# Patient Record
Sex: FEMALE | Race: BLACK OR AFRICAN AMERICAN | URBAN - METROPOLITAN AREA
[De-identification: names, ages, dates, MRNs, and addresses within clinical notes are randomized per-mention and may not be internally consistent; named-entity substitution may affect disease eponyms.]

---

## 2022-05-06 ENCOUNTER — OFFICE VISIT (OUTPATIENT)
Dept: FAMILY MEDICINE CLINIC | Facility: CLINIC | Age: 77
End: 2022-05-06
Payer: MEDICARE

## 2022-05-06 VITALS
HEIGHT: 62.8 IN | HEART RATE: 82 BPM | WEIGHT: 170 LBS | SYSTOLIC BLOOD PRESSURE: 124 MMHG | BODY MASS INDEX: 30.12 KG/M2 | DIASTOLIC BLOOD PRESSURE: 85 MMHG | TEMPERATURE: 98 F | RESPIRATION RATE: 18 BRPM

## 2022-05-06 DIAGNOSIS — Z13.820 ENCOUNTER FOR OSTEOPOROSIS SCREENING IN ASYMPTOMATIC POSTMENOPAUSAL PATIENT: ICD-10-CM

## 2022-05-06 DIAGNOSIS — I10 PRIMARY HYPERTENSION: ICD-10-CM

## 2022-05-06 DIAGNOSIS — Z76.89 ESTABLISHING CARE WITH NEW DOCTOR, ENCOUNTER FOR: ICD-10-CM

## 2022-05-06 DIAGNOSIS — Z78.0 ENCOUNTER FOR OSTEOPOROSIS SCREENING IN ASYMPTOMATIC POSTMENOPAUSAL PATIENT: ICD-10-CM

## 2022-05-06 DIAGNOSIS — Z12.11 COLON CANCER SCREENING: Primary | ICD-10-CM

## 2022-05-06 DIAGNOSIS — Z12.31 ENCOUNTER FOR SCREENING MAMMOGRAM FOR MALIGNANT NEOPLASM OF BREAST: ICD-10-CM

## 2022-05-06 PROCEDURE — 99204 OFFICE O/P NEW MOD 45 MIN: CPT | Performed by: FAMILY MEDICINE

## 2022-05-06 PROCEDURE — 3008F BODY MASS INDEX DOCD: CPT | Performed by: FAMILY MEDICINE

## 2022-05-06 PROCEDURE — 3074F SYST BP LT 130 MM HG: CPT | Performed by: FAMILY MEDICINE

## 2022-05-06 PROCEDURE — 3079F DIAST BP 80-89 MM HG: CPT | Performed by: FAMILY MEDICINE

## 2022-05-06 RX ORDER — HYDROCHLOROTHIAZIDE 12.5 MG/1
12.5 CAPSULE, GELATIN COATED ORAL DAILY
Qty: 90 CAPSULE | Refills: 3 | Status: SHIPPED | OUTPATIENT
Start: 2022-05-06

## 2022-05-06 RX ORDER — AMLODIPINE BESYLATE 10 MG/1
10 TABLET ORAL DAILY
Qty: 90 TABLET | Refills: 3 | Status: SHIPPED | OUTPATIENT
Start: 2022-05-06

## 2022-05-06 RX ORDER — HYDROCHLOROTHIAZIDE 12.5 MG/1
12.5 CAPSULE, GELATIN COATED ORAL DAILY
COMMUNITY
End: 2022-05-06

## 2022-05-06 RX ORDER — BRIMONIDINE TARTRATE 0.15 %
DROPS OPHTHALMIC (EYE)
COMMUNITY
Start: 2022-01-09

## 2022-05-06 RX ORDER — AMLODIPINE BESYLATE 10 MG/1
10 TABLET ORAL DAILY
COMMUNITY
End: 2022-05-06

## 2022-05-06 RX ORDER — BENAZEPRIL HYDROCHLORIDE 40 MG/1
40 TABLET, FILM COATED ORAL DAILY
Qty: 90 TABLET | Refills: 3 | Status: SHIPPED | OUTPATIENT
Start: 2022-05-06

## 2022-05-06 RX ORDER — OFLOXACIN 3 MG/ML
SOLUTION/ DROPS OPHTHALMIC
COMMUNITY
Start: 2021-05-15

## 2022-05-06 RX ORDER — BENAZEPRIL HYDROCHLORIDE 40 MG/1
40 TABLET, FILM COATED ORAL DAILY
COMMUNITY
End: 2022-05-06

## 2022-05-06 NOTE — PATIENT INSTRUCTIONS
All adult screening ordered and done appropriate for patient's age and gender and risk factors and complaints. Medication reviewed and renewed where needed and appropriate. Comply with medications. Monitor blood pressures and record at home. Limit salt intake. GI consultation generated. Osteoporosis screen recommended.

## 2022-09-09 ENCOUNTER — OFFICE VISIT (OUTPATIENT)
Dept: FAMILY MEDICINE CLINIC | Facility: CLINIC | Age: 77
End: 2022-09-09
Payer: MEDICARE

## 2022-09-09 VITALS
HEART RATE: 60 BPM | BODY MASS INDEX: 29.66 KG/M2 | SYSTOLIC BLOOD PRESSURE: 130 MMHG | TEMPERATURE: 97 F | OXYGEN SATURATION: 95 % | WEIGHT: 167.38 LBS | HEIGHT: 63 IN | DIASTOLIC BLOOD PRESSURE: 88 MMHG | RESPIRATION RATE: 12 BRPM

## 2022-09-09 DIAGNOSIS — Z12.31 ENCOUNTER FOR SCREENING MAMMOGRAM FOR BREAST CANCER: Primary | ICD-10-CM

## 2022-09-09 DIAGNOSIS — I10 PRIMARY HYPERTENSION: ICD-10-CM

## 2022-09-09 PROCEDURE — 3008F BODY MASS INDEX DOCD: CPT | Performed by: FAMILY MEDICINE

## 2022-09-09 PROCEDURE — 3075F SYST BP GE 130 - 139MM HG: CPT | Performed by: FAMILY MEDICINE

## 2022-09-09 PROCEDURE — 3079F DIAST BP 80-89 MM HG: CPT | Performed by: FAMILY MEDICINE

## 2022-09-09 PROCEDURE — 99214 OFFICE O/P EST MOD 30 MIN: CPT | Performed by: FAMILY MEDICINE

## 2022-09-09 RX ORDER — AMLODIPINE BESYLATE 10 MG/1
10 TABLET ORAL DAILY
Qty: 90 TABLET | Refills: 3 | Status: SHIPPED | OUTPATIENT
Start: 2022-09-09

## 2022-09-09 RX ORDER — BENAZEPRIL HYDROCHLORIDE 40 MG/1
40 TABLET, FILM COATED ORAL DAILY
Qty: 90 TABLET | Refills: 3 | Status: SHIPPED | OUTPATIENT
Start: 2022-09-09

## 2022-09-09 RX ORDER — HYDROCHLOROTHIAZIDE 12.5 MG/1
12.5 CAPSULE, GELATIN COATED ORAL DAILY
Qty: 90 CAPSULE | Refills: 3 | Status: SHIPPED | OUTPATIENT
Start: 2022-09-09

## 2022-09-09 NOTE — PATIENT INSTRUCTIONS
Medication has been reviewed and renewed. Order for mammogram has been placed on the chart. Medication reviewed and renewed where needed and appropriate. Comply with medications. Monitor blood pressures and record at home. Limit salt intake.

## 2023-05-17 ENCOUNTER — HOSPITAL ENCOUNTER (OUTPATIENT)
Age: 78
Discharge: HOME | End: 2023-05-17
Payer: MEDICARE

## 2023-05-17 VITALS
DIASTOLIC BLOOD PRESSURE: 89 MMHG | TEMPERATURE: 98 F | HEART RATE: 72 BPM | RESPIRATION RATE: 18 BRPM | OXYGEN SATURATION: 99 % | SYSTOLIC BLOOD PRESSURE: 129 MMHG

## 2023-05-17 DIAGNOSIS — L24.9 IRRITANT CONTACT DERMATITIS, UNSPECIFIED TRIGGER: Primary | ICD-10-CM

## 2023-05-17 DIAGNOSIS — R03.0 ELEVATED BLOOD PRESSURE READING: ICD-10-CM

## 2023-05-17 PROCEDURE — 99203 OFFICE O/P NEW LOW 30 MIN: CPT | Performed by: NURSE PRACTITIONER

## 2023-05-17 RX ORDER — TRIAMCINOLONE ACETONIDE 5 MG/G
1 OINTMENT TOPICAL 2 TIMES DAILY
Qty: 30 G | Refills: 1 | Status: SHIPPED | OUTPATIENT
Start: 2023-05-17

## 2023-05-17 NOTE — ED INITIAL ASSESSMENT (HPI)
Pt here with a red itchy rash to bilateral arms, chest and behind bilateral ears for a week after gardening and using a chemical. Pt has used benadryl with little relief.

## 2023-09-06 ENCOUNTER — TELEPHONE (OUTPATIENT)
Dept: FAMILY MEDICINE CLINIC | Facility: CLINIC | Age: 78
End: 2023-09-06

## 2023-09-06 DIAGNOSIS — I10 PRIMARY HYPERTENSION: ICD-10-CM

## 2023-09-06 RX ORDER — HYDROCHLOROTHIAZIDE 12.5 MG/1
12.5 CAPSULE, GELATIN COATED ORAL DAILY
Qty: 90 CAPSULE | Refills: 3 | Status: SHIPPED | OUTPATIENT
Start: 2023-09-06

## 2023-09-06 RX ORDER — AMLODIPINE BESYLATE 10 MG/1
10 TABLET ORAL DAILY
Qty: 90 TABLET | Refills: 3 | Status: SHIPPED | OUTPATIENT
Start: 2023-09-06

## 2023-09-06 RX ORDER — BENAZEPRIL HYDROCHLORIDE 40 MG/1
40 TABLET, FILM COATED ORAL DAILY
Qty: 90 TABLET | Refills: 3 | Status: SHIPPED | OUTPATIENT
Start: 2023-09-06

## 2023-09-06 NOTE — TELEPHONE ENCOUNTER
Please review; protocol failed. No active /future labs noted   Message sent for patient to make an appointment. Requested Prescriptions   Pending Prescriptions Disp Refills    HYDROCHLOROTHIAZIDE 12.5 MG Oral Cap [Pharmacy Med Name: HYDROCHLOROTHIAZIDE 12.5 MG CP] 90 capsule 3     Sig: TAKE 1 CAPSULE BY MOUTH EVERY DAY       Hypertensive Medications Protocol Failed - 9/6/2023 12:05 AM        Failed - CMP or BMP in past 6 months     No results found for this or any previous visit (from the past 4392 hour(s)). Failed - In person appointment or virtual visit in the past 6 months     Recent Outpatient Visits              12 months ago Encounter for screening mammogram for breast cancer    [facility],     Office Visit    1 year ago Colon cancer screening    [facility] [place]    Office Visit                      Failed - EGFRCR or GFRAA > 50     GFR Evaluation            Passed - In person appointment in the past 12 or next 3 months     Recent Outpatient Visits              12 months ago Encounter for screening mammogram for breast cancer    [facility],     Office Visit    1 year ago Colon cancer screening    [facility] [place]    Office Visit                      Passed - Last BP reading less than 140/90     BP Readings from Last 1 Encounters:  05/17/23 : 129/89                BENAZEPRIL HCL 40 MG Oral Tab [Pharmacy Med Name: BENAZEPRIL HCL 40 MG TABLET] 90 tablet 3     Sig: TAKE 1 TABLET BY MOUTH EVERY DAY       Hypertensive Medications Protocol Failed - 9/6/2023 12:05 AM        Failed - CMP or BMP in past 6 months     No results found for this or any previous visit (from the past 4392 hour(s)).             Failed - In person appointment or virtual visit in the past 6 months     Recent Outpatient Visits              12 months ago Encounter for screening mammogram for breast cancer    [address] [place] [address],     Office Visit    1 year ago Colon cancer screening    [address] [place] [address]    Office Visit                      Failed - EGFRCR or GFRAA > 50     GFR Evaluation            Passed - In person appointment in the past 12 or next 3 months     Recent Outpatient Visits              12 months ago Encounter for screening mammogram for breast cancer    [address] [place] [address],     Office Visit    1 year ago Colon cancer screening    [address] [place] [address]    Office Visit                      Passed - Last BP reading less than 140/90     BP Readings from Last 1 Encounters:  05/17/23 : 129/89                AMLODIPINE 10 MG Oral Tab [Pharmacy Med Name: AMLODIPINE BESYLATE 10 MG TAB] 90 tablet 3     Sig: TAKE 1 TABLET BY MOUTH EVERY DAY       Hypertensive Medications Protocol Failed - 9/6/2023 12:05 AM        Failed - CMP or BMP in past 6 months     No results found for this or any previous visit (from the past 4392 hour(s)).             Failed - In person appointment or virtual visit in the past 6 months     Recent Outpatient Visits              12 months ago Encounter for screening mammogram for breast cancer    [address] [place] [address],     Office Visit    1 year ago Colon cancer screening    [address] [place] [address]    Office Visit                      Failed - EGFRCR or GFRAA > 50     GFR Evaluation            Passed - In person appointment in the past 12 or next 3 months     Recent Outpatient Visits              12 months ago Encounter for screening mammogram for breast cancer    [facility], [address] [facility], [place], [place]    Office Visit    1 year ago Colon cancer screening    [address] [facility], [facility] [place], [place]    Office Visit                      Passed - Last BP reading less than 140/90     BP Readings from Last 1 Encounters:  05/17/23 : 129/89                 Recent Outpatient Visits              12 months ago Encounter for screening mammogram for breast cancer    [address] [facility], [place], [place]    Office Visit    1 year ago Colon cancer screening    [address] [facility], [place]    Office Visit

## 2024-01-04 ENCOUNTER — OFFICE VISIT (OUTPATIENT)
Dept: FAMILY MEDICINE CLINIC | Facility: CLINIC | Age: 79
End: 2024-01-04
Payer: MEDICARE

## 2024-01-04 ENCOUNTER — LAB ENCOUNTER (OUTPATIENT)
Dept: LAB | Age: 79
End: 2024-01-04
Attending: FAMILY MEDICINE
Payer: MEDICARE

## 2024-01-04 VITALS
WEIGHT: 166 LBS | DIASTOLIC BLOOD PRESSURE: 89 MMHG | SYSTOLIC BLOOD PRESSURE: 124 MMHG | BODY MASS INDEX: 29.41 KG/M2 | HEART RATE: 69 BPM | HEIGHT: 63 IN | TEMPERATURE: 98 F

## 2024-01-04 DIAGNOSIS — Z28.21 VARICELLA ZOSTER VIRUS (VZV) VACCINATION DECLINED: ICD-10-CM

## 2024-01-04 DIAGNOSIS — I10 PRIMARY HYPERTENSION: ICD-10-CM

## 2024-01-04 DIAGNOSIS — Z00.00 MEDICARE ANNUAL WELLNESS VISIT, SUBSEQUENT: Primary | ICD-10-CM

## 2024-01-04 DIAGNOSIS — Z13.820 ENCOUNTER FOR OSTEOPOROSIS SCREENING IN ASYMPTOMATIC POSTMENOPAUSAL PATIENT: ICD-10-CM

## 2024-01-04 DIAGNOSIS — Z12.31 ENCOUNTER FOR SCREENING MAMMOGRAM FOR BREAST CANCER: ICD-10-CM

## 2024-01-04 DIAGNOSIS — Z28.21 PNEUMOCOCCAL VACCINATION DECLINED BY PATIENT: ICD-10-CM

## 2024-01-04 DIAGNOSIS — Z78.0 ENCOUNTER FOR OSTEOPOROSIS SCREENING IN ASYMPTOMATIC POSTMENOPAUSAL PATIENT: ICD-10-CM

## 2024-01-04 PROCEDURE — 1126F AMNT PAIN NOTED NONE PRSNT: CPT | Performed by: FAMILY MEDICINE

## 2024-01-04 PROCEDURE — 3074F SYST BP LT 130 MM HG: CPT | Performed by: FAMILY MEDICINE

## 2024-01-04 PROCEDURE — 96160 PT-FOCUSED HLTH RISK ASSMT: CPT | Performed by: FAMILY MEDICINE

## 2024-01-04 PROCEDURE — 1159F MED LIST DOCD IN RCRD: CPT | Performed by: FAMILY MEDICINE

## 2024-01-04 PROCEDURE — 3008F BODY MASS INDEX DOCD: CPT | Performed by: FAMILY MEDICINE

## 2024-01-04 PROCEDURE — G0439 PPPS, SUBSEQ VISIT: HCPCS | Performed by: FAMILY MEDICINE

## 2024-01-04 PROCEDURE — 1170F FXNL STATUS ASSESSED: CPT | Performed by: FAMILY MEDICINE

## 2024-01-04 PROCEDURE — 3079F DIAST BP 80-89 MM HG: CPT | Performed by: FAMILY MEDICINE

## 2024-01-04 RX ORDER — LATANOPROST 50 UG/ML
1 SOLUTION/ DROPS OPHTHALMIC NIGHTLY
COMMUNITY
Start: 2023-08-05

## 2024-01-04 RX ORDER — DORZOLAMIDE HYDROCHLORIDE AND TIMOLOL MALEATE 20; 5 MG/ML; MG/ML
1 SOLUTION/ DROPS OPHTHALMIC 2 TIMES DAILY
COMMUNITY

## 2024-01-04 RX ORDER — AMLODIPINE BESYLATE 10 MG/1
10 TABLET ORAL DAILY
Qty: 90 TABLET | Refills: 3 | Status: SHIPPED | OUTPATIENT
Start: 2024-01-04

## 2024-01-04 RX ORDER — SODIUM CHLORIDE 5 %
1 OINTMENT (GRAM) OPHTHALMIC (EYE) AS NEEDED
COMMUNITY

## 2024-01-04 NOTE — PATIENT INSTRUCTIONS
All adult screening ordered and done appropriate for patient's age and gender and risk factors and complaints.  Medication reviewed and renewed where needed and appropriate.  Recommend weight loss via daily exercising and consistent healthy dietary changes.  Encouraged physical fitness and daily physical activity daily.  Comply with medications.

## 2024-01-04 NOTE — PROGRESS NOTES
Subjective:     Patient ID: Ryanne Javed is a 78 year old female.    This patient has a 78-year-old hypertensive -American female who is here for Medicare annual visit which will also satisfy all the requirements for a complete preventive care physical and for status update on any confirmed chronic medical illnesses and follow up on any previous labs or procedures that were suggestive or in need of further work up. Colonoscopy is current. Bowel and bladder functions are intact.    Patient is due for mammogram and also due for osteoporosis screening.    Patient declines recommended shingles and pneumococcal vaccine.    Regarding hypertension, the patient is asymptomatic.  Patient denies headache, chest pain, dizziness, shortness of breath, visual changes, and/or exertional fatigue.          History/Other:   Review of Systems  Current Outpatient Medications   Medication Sig Dispense Refill    dorzolamide-timolol 2-0.5 % Ophthalmic Solution 1 drop 2 (two) times daily.      polypropylene glycol- 0.4-0.3 % Ophthalmic Solution Place 1 drop into the right eye as needed.      sodium chloride, hypertonic, 5 % Ophthalmic Ointment 1 Application as needed.      amLODIPine 10 MG Oral Tab Take 1 tablet (10 mg total) by mouth daily. 90 tablet 3    hydroCHLOROthiazide 12.5 MG Oral Cap Take 1 capsule (12.5 mg total) by mouth daily. 90 capsule 3    triamcinolone 0.5 % External Ointment Apply 1 Application topically 2 (two) times daily. 30 g 1    latanoprost 0.005 % Ophthalmic Solution Place 1 drop into the left eye nightly. AT BEDTIME (Patient not taking: Reported on 1/4/2024)      Benazepril HCl 40 MG Oral Tab Take 1 tablet (40 mg total) by mouth daily. (Patient not taking: Reported on 1/4/2024) 90 tablet 3    brimonidine 0.15 % Ophthalmic Solution APPLY 1 DROP INTO AFFECTED EYE AS DIRECTED (Patient not taking: Reported on 1/4/2024)      ofloxacin 0.3 % Ophthalmic Solution  (Patient not taking: Reported on 1/4/2024)        Allergies:No Known Allergies    Past Medical History:   Diagnosis Date    Cataract     Essential hypertension       History reviewed. No pertinent surgical history.   History reviewed. No pertinent family history.   Social History:   Social History     Socioeconomic History    Marital status:    Tobacco Use    Smoking status: Never    Smokeless tobacco: Never   Vaping Use    Vaping Use: Never used   Substance and Sexual Activity    Alcohol use: Never    Drug use: Never        Ryanne Javed's SCREENING SCHEDULE   Tests on this list are recommended by your physician but may not be covered, or covered at this frequency, by your insurer. Please check with your insurance carrier before scheduling to verify coverage.   PREVENTATIVE SERVICES  INDICATIONS AND SCHEDULE Internal Lab or Procedure External Lab or Procedure   Diabetes Screening      HbgA1C   Annually No results found for: \"A1C\"      No data to display                Fasting Blood Sugar (FSB)Annually No results found for: \"GLUCOSE\", \"GLU\"      No data to display               Cardiovascular Disease Screening     LDL Annually No results found for: \"LDL\", \"LDLC\"     EKG One Time      Colorectal Cancer Screening      Colonoscopy Screen every 10 years No recommendations at this time Update Health Maintenance if applicable    Flex Sigmoidoscopy Screen every 5 years No results found for this or any previous visit.      No data to display                 Fecal Occult Blood Annually No results found for: \"FOB\", \"OCCULTSTOOL\"      No data to display                Glaucoma Screening      Ophthalmology Visit Annually      Bone Density Screening      Dexascan Every two years No results found for this or any previous visit.        No data to display               Pap and Pelvic      Pap: Every 3 yrs age 21-65 or Pap+HPV every 5 yrs age 30-65, age 65 and older at high risk   No recommendations at this time Update Health Maintenance if applicable    Chlamydia   Annually if high risk No results found for: \"CHLAMYDIA\"      No data to display                Screening Mammogram      Mammogram  Annually No recommendations at this time Update Health Maintenance if applicable   Immunizations      Influenza No orders found for this or any previous visit. Update Immunization Activity if applicable    Pneumococcal No orders found for this or any previous visit. Update Immunization Activity if applicable    Hepatitis B No orders found for this or any previous visit. Update Immunization Activity if applicable    Tetanus No orders found for this or any previous visit. Update Immunization Activity if applicable    Zoster (Not covered by Medicare Part B) No orders found for this or any previous visit. Update Immunization Activity if applicable     SPECIFIC DISEASE MONITORING Internal Lab or Procedure External Lab or Procedure   Annual Monitoring of Persistent     Medications (ACE/ARB, digoxin, diuretics)    Potassium  Annually No results found for: \"K\", \"POTASSIUM\"      No data to display                Creatinine  Annually No results found for: \"CREATININE\", \"CREATSERUM\"      No data to display                Digoxin Serum Conc  Annually No results found for: \"DIGOXIN\"      No data to display                Diabetes      HgbA1C  Annually No results found for: \"A1C\"      No data to display                Creat/alb ratio  Annually      LDL  Annually No results found for: \"LDL\", \"LDLC\"      No data to display                 Dilated Eye exam  Annually      No data to display                   No data to display                COPD      Spirometry Testing Annually No results found for this or any previous visit.      No data to display                      General Health     In the past six months, have you lost more than 10 pounds without trying?: 2 - No    Has your appetite been poor?: No    Type of Diet: Balanced    How does the patient maintain a good energy level?: Daily  Walks;Stretching    How would you describe your daily physical activity?: Light    How would you describe your current health state?: Good    How do you maintain positive mental well-being?: Puzzles;Games;Visiting Friends         Have you had any immunizations at another office such as Influenza, Hepatitis B, Tetanus, or Pneumococcal?: No     Functional Ability     Bathing or Showering: Able without help    Toileting: Able without help    Dressing: Able without help    Eating: Able without help    Driving: Does not drive    Preparing your meals: Able without help    Managing money/bills: Able without help    Taking medications as prescribed: Able without help    Are you able to afford your medications?: Yes    Hearing Problems?: No     Functional Status     Hearing Problems?: No    Vision Problems? : Yes    Difficulty walking?: No    Difficulty dressing or bathing?: No    Problems with daily activities? : No    Memory Problems?: No      Fall/Risk Assessment                                                              Depression Screening (PHQ-2/PHQ-9): Over the LAST 2 WEEKS                      Advance Directives     Do you have a healthcare power of ?: Yes    Do you have a living will?: Yes     Hearing Assessment (Required for AWV/SWV)      Hearing Screening    Time taken: 1/4/2024  9:14 AM  Entry User: Selena Strong MA  Screening Method: Questionnaire  I have a problem hearing over the telephone: No I have trouble following the conversations when two or more people are talking at the same time: No   I have trouble understanding things on the TV: No I have to strain to understand conversations: No   I have to worry about missing the telephone ring or doorbell: No I have trouble hearing conversations in a noisy background such as a crowded room or restaurant: No   I get confused about where sounds come from: No I misunderstand some words in a sentence and need to ask people to repeat themselves: No   I  especially have trouble understanding the speech of women and children: No I have trouble understanding the speaker in a large room such as at a meeting or place of Islam: No   Many people I talk to seem to mumble (or don't speak clearly): No People get annoyed because I misunderstand what they say: No   I misunderstand what others are saying and make inappropriate responses: No I avoid social activities because I cannot hear well and fear I will reply improperly: No   Family members and friends have told me they think I may have hearing loss: No             Visual Acuity     Right Eye Visual Acuity: Uncorrected Left Eye Visual Acuity: Uncorrected   Right Eye Chart Acuity: 20/40 Left Eye Chart Acuity: 20/40     Cognitive Assessment     What day of the week is this?: Correct    What month is it?: Correct    What year is it?: Correct    Recall \"Ball\": Correct    Recall \"Flag\": Correct    Recall \"Tree\": Correct          Objective:   Vitals:    01/04/24 0906   BP: 124/89   Pulse: 69   Temp: 97.8 °F (36.6 °C)       Physical Exam  Constitutional:       Appearance: Normal appearance.   HENT:      Head: Normocephalic and atraumatic.      Right Ear: Tympanic membrane normal.      Left Ear: Tympanic membrane normal.      Nose: Nose normal.      Mouth/Throat:      Mouth: Mucous membranes are moist.   Neck:      Thyroid: No thyromegaly.   Cardiovascular:      Rate and Rhythm: Normal rate and regular rhythm.   Pulmonary:      Breath sounds: Normal breath sounds.   Abdominal:      General: Bowel sounds are normal.      Palpations: Abdomen is soft. There is no mass.      Comments: No pulsatile mass.   Neurological:      Mental Status: She is alert and oriented to person, place, and time.   Psychiatric:         Mood and Affect: Mood normal.         Assessment & Plan:   1. Medicare annual wellness visit, subsequent  Survey completed and the following labs have been ordered.  - CBC With Diff  - CMP  - Lipid Panel  - TSH,  Reflex  Free T4    2. Primary hypertension  Blood pressure measures to goal when measured manually and also electronically.  - amLODIPine 10 MG Oral Tab; Take 1 tablet (10 mg total) by mouth daily.  Dispense: 90 tablet; Refill: 3    3. Pneumococcal vaccination declined by patient  Pneumococcal vaccine declined by patient for now.  - Prevnar 20 (PCV20) [83401]    4. Varicella zoster virus (VZV) vaccination declined  Declined for now.    5. Encounter for osteoporosis screening in asymptomatic postmenopausal patient  Ordered.  - XR DEXA BONE DENSITOMETRY (CPT=77080); Future    6. Encounter for screening mammogram for breast cancer  Mammogram ordered.        Orders Placed This Encounter   Procedures    CBC With Diff    CMP    Lipid Panel    TSH,  Reflex Free T4    Prevnar 20 (PCV20) [66607]       Meds This Visit:  Requested Prescriptions     Signed Prescriptions Disp Refills    amLODIPine 10 MG Oral Tab 90 tablet 3     Sig: Take 1 tablet (10 mg total) by mouth daily.       Imaging & Referrals:  PCV20 VACCINE FOR INTRAMUSCULAR USE  XR DEXA BONE DENSITOMETRY (CPT=77080)     Patient Instructions   All adult screening ordered and done appropriate for patient's age and gender and risk factors and complaints.  Medication reviewed and renewed where needed and appropriate.  Recommend weight loss via daily exercising and consistent healthy dietary changes.  Encouraged physical fitness and daily physical activity daily.  Comply with medications.    Return in about 1 year (around 1/4/2025), or if symptoms worsen or fail to improve.

## 2024-01-05 LAB
ABSOLUTE BASOPHILS: 23 CELLS/UL (ref 0–200)
ABSOLUTE EOSINOPHILS: 120 CELLS/UL (ref 15–500)
ABSOLUTE LYMPHOCYTES: 2024 CELLS/UL (ref 850–3900)
ABSOLUTE MONOCYTES: 559 CELLS/UL (ref 200–950)
ABSOLUTE NEUTROPHILS: 2975 CELLS/UL (ref 1500–7800)
ALBUMIN/GLOBULIN RATIO: 1.4 (CALC) (ref 1–2.5)
ALBUMIN: 4.2 G/DL (ref 3.6–5.1)
ALKALINE PHOSPHATASE: 64 U/L (ref 37–153)
ALT: 10 U/L (ref 6–29)
AST: 16 U/L (ref 10–35)
BASOPHILS: 0.4 %
BILIRUBIN, TOTAL: 0.6 MG/DL (ref 0.2–1.2)
BUN: 15 MG/DL (ref 7–25)
CALCIUM: 9.8 MG/DL (ref 8.6–10.4)
CARBON DIOXIDE: 31 MMOL/L (ref 20–32)
CHLORIDE: 100 MMOL/L (ref 98–110)
CHOL/HDLC RATIO: 4.3 (CALC)
CHOLESTEROL, TOTAL: 291 MG/DL
CREATININE: 0.89 MG/DL (ref 0.6–1)
EGFR: 67 ML/MIN/1.73M2
EOSINOPHILS: 2.1 %
GLOBULIN: 3 G/DL (CALC) (ref 1.9–3.7)
GLUCOSE: 89 MG/DL (ref 65–99)
HDL CHOLESTEROL: 67 MG/DL
HEMATOCRIT: 42.7 % (ref 35–45)
HEMOGLOBIN: 13.9 G/DL (ref 11.7–15.5)
LDL-CHOLESTEROL: 198 MG/DL (CALC)
LYMPHOCYTES: 35.5 %
MCH: 28.4 PG (ref 27–33)
MCHC: 32.6 G/DL (ref 32–36)
MCV: 87.1 FL (ref 80–100)
MONOCYTES: 9.8 %
MPV: 9.6 FL (ref 7.5–12.5)
NEUTROPHILS: 52.2 %
NON-HDL CHOLESTEROL: 224 MG/DL (CALC)
PLATELET COUNT: 290 THOUSAND/UL (ref 140–400)
POTASSIUM: 4.4 MMOL/L (ref 3.5–5.3)
PROTEIN, TOTAL: 7.2 G/DL (ref 6.1–8.1)
RDW: 13.8 % (ref 11–15)
RED BLOOD CELL COUNT: 4.9 MILLION/UL (ref 3.8–5.1)
SODIUM: 139 MMOL/L (ref 135–146)
TRIGLYCERIDES: 115 MG/DL
TSH W/REFLEX TO FT4: 2.12 MIU/L (ref 0.4–4.5)
WHITE BLOOD CELL COUNT: 5.7 THOUSAND/UL (ref 3.8–10.8)

## 2024-01-17 DIAGNOSIS — I10 PRIMARY HYPERTENSION: ICD-10-CM

## 2024-01-17 NOTE — TELEPHONE ENCOUNTER
Patient was seen in clinic on 1/4 and says only script for rx amlodipine was sent. Patient says she never received scripts for rx hydrochlorodthiazide, and rx Benazepril. Please send to [facility]. Please update patient at [phone],thanks.

## 2024-01-18 DIAGNOSIS — I10 PRIMARY HYPERTENSION: ICD-10-CM

## 2024-01-18 RX ORDER — BENAZEPRIL HYDROCHLORIDE 40 MG/1
40 TABLET ORAL DAILY
Qty: 90 TABLET | Refills: 2 | Status: SHIPPED | OUTPATIENT
Start: 2024-01-18

## 2024-01-18 RX ORDER — HYDROCHLOROTHIAZIDE 12.5 MG/1
12.5 CAPSULE, GELATIN COATED ORAL DAILY
Qty: 90 CAPSULE | Refills: 2 | Status: SHIPPED | OUTPATIENT
Start: 2024-01-18

## 2024-01-18 NOTE — TELEPHONE ENCOUNTER
Refill passed per [facility] protocol.  Requested Prescriptions   Pending Prescriptions Disp Refills    hydroCHLOROthiazide 12.5 MG Oral Cap 90 capsule 3     Sig: Take 1 capsule (12.5 mg total) by mouth daily.       Hypertensive Medications Protocol Passed - 1/18/2024 10:00 AM        Passed - In person appointment in the past 12 or next 3 months     Recent Outpatient Visits              2 weeks ago Medicare annual wellness visit, subsequent    [facility], [facility] Van Max, DO    Office Visit    1 year ago Encounter for screening mammogram for breast cancer    [facility] Van Max, DO    Office Visit    1 year ago Colon cancer screening    [facility] Van Max, DO    Office Visit          Future Appointments         Provider Department Appt Notes    In 11 months Van Max, DO [facility] annual Medicare wellness exam               Passed - Last BP reading less than 140/90     BP Readings from Last 1 Encounters:   01/04/24 124/89               Passed - CMP or BMP in past 6 months     Recent Results (from the past 4392 hour(s))   CMP    Collection Time: 01/04/24 10:09 AM   Result Value Ref Range    GLUCOSE 89 65 - 99 mg/dL     Comment:               Fasting reference interval         UREA NITROGEN (BUN) 15 7 - 25 mg/dL    CREATININE 0.89 0.60 - 1.00 mg/dL    EGFR 67 > OR = 60 mL/min/1.73m2    BUN/CREATININE RATIO SEE NOTE: 6 - 22 (calc)     Comment:    Not Reported: BUN and Creatinine are within     reference range.            SODIUM 139 135 - 146 mmol/L    POTASSIUM 4.4 3.5 - 5.3 mmol/L    CHLORIDE 100 98 - 110 mmol/L    CARBON DIOXIDE 31 20 - 32 mmol/L    CALCIUM 9.8 8.6 - 10.4 mg/dL    PROTEIN, TOTAL 7.2 6.1 - 8.1 g/dL    ALBUMIN 4.2 3.6 - 5.1 g/dL    GLOBULIN 3.0 1.9 - 3.7 g/dL (calc)    ALBUMIN/GLOBULIN RATIO 1.4  1.0 - 2.5 (calc)    BILIRUBIN, TOTAL 0.6 0.2 - 1.2 mg/dL    ALKALINE PHOSPHATASE 64 37 - 153 U/L    AST 16 10 - 35 U/L    ALT 10 6 - 29 U/L     *Note: Due to a large number of results and/or encounters for the requested time period, some results have not been displayed. A complete set of results can be found in Results Review.               Passed - In person appointment or virtual visit in the past 6 months     Recent Outpatient Visits              2 weeks ago Medicare annual wellness visit, subsequent    [facility], [facility] Vna Max, DO    Office Visit    1 year ago Encounter for screening mammogram for breast cancer    [facility] Van Max, DO    Office Visit    1 year ago Colon cancer screening    [facility] Van aMx, DO    Office Visit          Future Appointments         Provider Department Appt Notes    In 11 months Van Max,  [facility], [facility] annual Medicare wellness exam               Passed - EGFRCR or GFRAA > 50     GFR Evaluation  EGFRCR: 67 , resulted on 1/4/2024            Benazepril HCl 40 MG Oral Tab 90 tablet 3     Sig: Take 1 tablet (40 mg total) by mouth daily.       Hypertensive Medications Protocol Passed - 1/18/2024 10:00 AM        Passed - In person appointment in the past 12 or next 3 months     Recent Outpatient Visits              2 weeks ago Medicare annual wellness visit, subsequent    [facility], [facility] Van Max, DO    Office Visit    1 year ago Encounter for screening mammogram for breast cancer    [facility] [place]    Office Visit    1 year ago Colon cancer screening    [facility] Van Max, DO    Office Visit          Future Appointments          Provider Department Appt Notes    In 11 months Van Max, DO [facility], [facility] annual Medicare wellness exam               Passed - Last BP reading less than 140/90     BP Readings from Last 1 Encounters:   01/04/24 124/89               Passed - CMP or BMP in past 6 months     Recent Results (from the past 4392 hour(s))   CMP    Collection Time: 01/04/24 10:09 AM   Result Value Ref Range    GLUCOSE 89 65 - 99 mg/dL     Comment:               Fasting reference interval         UREA NITROGEN (BUN) 15 7 - 25 mg/dL    CREATININE 0.89 0.60 - 1.00 mg/dL    EGFR 67 > OR = 60 mL/min/1.73m2    BUN/CREATININE RATIO SEE NOTE: 6 - 22 (calc)     Comment:    Not Reported: BUN and Creatinine are within     reference range.            SODIUM 139 135 - 146 mmol/L    POTASSIUM 4.4 3.5 - 5.3 mmol/L    CHLORIDE 100 98 - 110 mmol/L    CARBON DIOXIDE 31 20 - 32 mmol/L    CALCIUM 9.8 8.6 - 10.4 mg/dL    PROTEIN, TOTAL 7.2 6.1 - 8.1 g/dL    ALBUMIN 4.2 3.6 - 5.1 g/dL    GLOBULIN 3.0 1.9 - 3.7 g/dL (calc)    ALBUMIN/GLOBULIN RATIO 1.4 1.0 - 2.5 (calc)    BILIRUBIN, TOTAL 0.6 0.2 - 1.2 mg/dL    ALKALINE PHOSPHATASE 64 37 - 153 U/L    AST 16 10 - 35 U/L    ALT 10 6 - 29 U/L     *Note: Due to a large number of results and/or encounters for the requested time period, some results have not been displayed. A complete set of results can be found in Results Review.               Passed - In person appointment or virtual visit in the past 6 months     Recent Outpatient Visits              2 weeks ago Medicare annual wellness visit, subsequent    [facility] Van Max, DO    Office Visit    1 year ago Encounter for screening mammogram for breast cancer    [facility] Van Max, DO    Office Visit    1 year ago Colon cancer screening    [facility] [place],  Van GILES, DO    Office Visit          Future Appointments         Provider Department Appt Notes    In 11 months Van Max, DO [facility] annual Medicare wellness exam               Passed - EGFRCR or GFRAA > 50     GFR Evaluation  EGFRCR: 67 , resulted on 1/4/2024             Future Appointments         Provider Department Appt Notes    In 11 months Van Max, DO [facility] annual Medicare wellness exam          Recent Outpatient Visits              2 weeks ago Medicare annual wellness visit, subsequent    [facility] Van Max, DO    Office Visit    1 year ago Encounter for screening mammogram for breast cancer    [facility] Van Max, DO    Office Visit    1 year ago Colon cancer screening    [facility] Van Max, DO    Office Visit

## 2024-01-19 RX ORDER — BENAZEPRIL HYDROCHLORIDE 40 MG/1
40 TABLET ORAL DAILY
Qty: 90 TABLET | Refills: 2 | OUTPATIENT
Start: 2024-01-19

## 2024-01-19 RX ORDER — SODIUM CHLORIDE 5 %
1 OINTMENT (GRAM) OPHTHALMIC (EYE) AS NEEDED
Qty: 3.5 G | Refills: 0 | Status: SHIPPED | OUTPATIENT
Start: 2024-01-19

## 2024-01-19 NOTE — TELEPHONE ENCOUNTER
Please review; protocol failed/ has no protocol    Medication is listed as patient reported.    Requested Prescriptions   Pending Prescriptions Disp Refills    sodium chloride, hypertonic, 5 % Ophthalmic Ointment  0     Si Application as needed.       There is no refill protocol information for this order        Recent Outpatient Visits              2 weeks ago Medicare annual wellness visit, subsequent    [facility], [facility] Van Max,     Office Visit    1 year ago Encounter for screening mammogram for breast cancer    [facility] Van Max,     Office Visit    1 year ago Colon cancer screening    [facility] Van Max, DO    Office Visit          Future Appointments         Provider Department Appt Notes    In 11 months Van Max, DO [facility] annual Medicare wellness exam

## 2024-01-24 RX ORDER — SODIUM CHLORIDE 5 %
1 OINTMENT (GRAM) OPHTHALMIC (EYE) AS NEEDED
Qty: 3.5 G | Refills: 0 | Status: SHIPPED | OUTPATIENT
Start: 2024-01-24

## 2024-01-24 NOTE — TELEPHONE ENCOUNTER
sodium chloride, hypertonic, 5 % Ophthalmic Ointment, Place 1 Application into both eyes as needed., Disp: 3.5 g, Rfl: 0    Message: RX for aoduim chloride 5% OPHT OINT Missing dosing Frequency

## 2024-01-29 ENCOUNTER — TELEPHONE (OUTPATIENT)
Dept: FAMILY MEDICINE CLINIC | Facility: CLINIC | Age: 79
End: 2024-01-29

## 2024-01-29 NOTE — TELEPHONE ENCOUNTER
Dr. Max - Please specify Max dose per day, for Eye ointment.     RN Called pharmacy. Patient's date of birth and full name both confirmed.   Spoke with Danita   Needs specifications on Dosing frequency - max per day , as needed.

## 2024-01-31 NOTE — TELEPHONE ENCOUNTER
Can you please call the patient and ask her to have her ophthalmologist managed this prescription, so that he can be properly prescribed/refilled?  Thank you.

## 2024-01-31 NOTE — TELEPHONE ENCOUNTER
Verified name and .    Patient was advised of Dr. Max's message. Patient verbalizes understanding and agrees with plan.

## 2024-04-04 ENCOUNTER — TELEPHONE (OUTPATIENT)
Dept: FAMILY MEDICINE CLINIC | Facility: CLINIC | Age: 79
End: 2024-04-04

## 2024-04-04 DIAGNOSIS — Z01.00 EYE EXAM, ROUTINE: Primary | ICD-10-CM

## 2024-04-04 DIAGNOSIS — Z98.49 HISTORY OF CATARACT EXTRACTION, UNSPECIFIED LATERALITY: ICD-10-CM

## 2024-04-04 NOTE — TELEPHONE ENCOUNTER
Bryan stopped in the [place] [facility], states his mother, Ryanne, is at [facility] right now for a follow-up appointment. She was put on eye drops and is at the office to have a follow-up appointment to see if everything is OK with her eyes. She was told a referral is needed for today's appointment, she has [employer] Medicare HMO. Can a referral please be issued for today's appointment?

## 2024-04-05 ENCOUNTER — TELEPHONE (OUTPATIENT)
Dept: CASE MANAGEMENT | Age: 79
End: 2024-04-05

## 2024-04-05 DIAGNOSIS — Z86.69 HISTORY OF CATARACT: Primary | ICD-10-CM

## 2024-04-05 NOTE — TELEPHONE ENCOUNTER
Dr Max,     Please provide the name of the specialist at [facility] to referral [id number].    Specialist name is required to obtain authorization.     Thank you

## 2024-04-08 ENCOUNTER — TELEPHONE (OUTPATIENT)
Dept: FAMILY MEDICINE CLINIC | Facility: CLINIC | Age: 79
End: 2024-04-08

## 2024-08-28 DIAGNOSIS — I10 PRIMARY HYPERTENSION: ICD-10-CM

## 2024-08-30 RX ORDER — BENAZEPRIL HYDROCHLORIDE 40 MG/1
40 TABLET ORAL DAILY
Qty: 90 TABLET | Refills: 3 | Status: SHIPPED | OUTPATIENT
Start: 2024-08-30

## 2024-08-30 RX ORDER — HYDROCHLOROTHIAZIDE 12.5 MG/1
12.5 CAPSULE ORAL DAILY
Qty: 90 CAPSULE | Refills: 3 | Status: SHIPPED | OUTPATIENT
Start: 2024-08-30

## 2024-08-30 NOTE — TELEPHONE ENCOUNTER
Refill passed per [facility] protocols.    Requested Prescriptions   Pending Prescriptions Disp Refills    BENAZEPRIL HCL 40 MG Oral Tab [Pharmacy Med Name: BENAZEPRIL HCL 40 MG Tablet] 90 tablet 3     Sig: TAKE 1 TABLET EVERY DAY       Hypertension Medications Protocol Passed - 8/28/2024  7:37 PM        Passed - CMP or BMP in past 12 months        Passed - Last BP reading less than 140/90     BP Readings from Last 1 Encounters:   01/04/24 124/89               Passed - In person appointment or virtual visit in the past 12 mos or appointment in next 3 mos     Recent Outpatient Visits              7 months ago Medicare annual wellness visit, subsequent    [facility], [facility] Van Max, DO    Office Visit    1 year ago Encounter for screening mammogram for breast cancer    [facility] Van Max, DO    Office Visit    2 years ago Colon cancer screening    [facility], [facility] Van Max, DO    Office Visit          Future Appointments         Provider Department Appt Notes    In 4 months Van Max, DO [facility], [facility] annual Medicare wellness exam                    Passed - EGFRCR or GFRAA > 50     GFR Evaluation  EGFRCR: 67 , resulted on 1/4/2024            HYDROCHLOROTHIAZIDE 12.5 MG Oral Cap [Pharmacy Med Name: HYDROCHLOROTHIAZIDE 12.5 MG Capsule] 90 capsule 3     Sig: TAKE 1 CAPSULE EVERY DAY       Hypertension Medications Protocol Passed - 8/28/2024  7:37 PM        Passed - CMP or BMP in past 12 months        Passed - Last BP reading less than 140/90     BP Readings from Last 1 Encounters:   01/04/24 124/89               Passed - In person appointment or virtual visit in the past 12 mos or appointment in next 3 mos     Recent Outpatient Visits              7 months ago Medicare annual wellness visit, subsequent    [facility]  [facility], [facility] Van Max, DO    Office Visit    1 year ago Encounter for screening mammogram for breast cancer    [facility] Van Max, DO    Office Visit    2 years ago Colon cancer screening    [facility] Van Max, DO    Office Visit          Future Appointments         Provider Department Appt Notes    In 4 months Van Max,  [facility] annual Medicare wellness exam                    Passed - EGFRCR or GFRAA > 50     GFR Evaluation  EGFRCR: 67 , resulted on 1/4/2024

## 2024-11-11 ENCOUNTER — TELEPHONE (OUTPATIENT)
Dept: FAMILY MEDICINE CLINIC | Facility: CLINIC | Age: 79
End: 2024-11-11

## 2024-11-16 NOTE — TELEPHONE ENCOUNTER
I received a call from Mary BIRD. She is the [employer] .  Patient had a in home wellness visit as part of the human plan. The examer was concern with orthostatic hypotension her sitting  b/p was 118/95, standing dropped to 96/84. Patient is asymptomatic does not feel dizzy and no falls. Patient is on 3 different blood pressure medications. Mary BIRD wants  to be aware of this orthostatic hypotension. She did inform patient to call if having symptoms and that she was going to make her PCP aware.      do you want to see patient in the office?or make any medication changes?  
If I am reading the message correctly, the patient remained asymptomatic.  Position change blood pressure measures have to be done a certain way.  I am not suggesting that they were not done the proper way, but this is a patient that has not been seen since January 4, 2024 and she probably should have had a follow-up regarding her blood pressure before now.  She is getting close to her annual visit.  Manual measures or electronic measures by qualified staff should continue.  If the patient is dizzy, has headaches, has visual changes, etc.  Then she should call 911 or at least contact our clinic.  Thank you.  
Spoke to pt , relayed Dr message, verbalized understanding, stated she tried to explain how she get nervous, but feels ok and if need will call us or worse 911  
No

## 2024-11-18 DIAGNOSIS — I10 PRIMARY HYPERTENSION: ICD-10-CM

## 2024-11-22 RX ORDER — AMLODIPINE BESYLATE 10 MG/1
10 TABLET ORAL DAILY
Qty: 90 TABLET | Refills: 3 | Status: SHIPPED | OUTPATIENT
Start: 2024-11-22

## 2024-11-22 NOTE — TELEPHONE ENCOUNTER
Refill passed per [facility] protocols.    Requested Prescriptions   Pending Prescriptions Disp Refills    AMLODIPINE 10 MG Oral Tab [Pharmacy Med Name: amLODIPine Besylate Oral Tablet 10 MG] 90 tablet 3     Sig: TAKE 1 TABLET EVERY DAY       Hypertension Medications Protocol Passed - 11/22/2024  9:32 AM        Passed - CMP or BMP in past 12 months        Passed - Last BP reading less than 140/90     BP Readings from Last 1 Encounters:   01/04/24 124/89               Passed - In person appointment or virtual visit in the past 12 mos or appointment in next 3 mos     Recent Outpatient Visits              10 months ago Medicare annual wellness visit, subsequent    [facility], [facility] Van Max, DO    Office Visit    2 years ago Encounter for screening mammogram for breast cancer    [facility] Van Max, DO    Office Visit    2 years ago Colon cancer screening    [facility] Van Max, DO    Office Visit          Future Appointments         Provider Department Appt Notes    In 1 month Van Max, DO [facility], [facility] annual Medicare wellness exam                    Passed - EGFRCR or GFRAA > 50     GFR Evaluation  EGFRCR: 67 , resulted on 1/4/2024

## 2025-01-09 ENCOUNTER — TELEPHONE (OUTPATIENT)
Dept: FAMILY MEDICINE CLINIC | Facility: CLINIC | Age: 80
End: 2025-01-09

## 2025-01-09 ENCOUNTER — LAB ENCOUNTER (OUTPATIENT)
Dept: LAB | Age: 80
End: 2025-01-09
Attending: FAMILY MEDICINE
Payer: MEDICARE

## 2025-01-09 ENCOUNTER — OFFICE VISIT (OUTPATIENT)
Dept: FAMILY MEDICINE CLINIC | Facility: CLINIC | Age: 80
End: 2025-01-09

## 2025-01-09 VITALS
TEMPERATURE: 98 F | OXYGEN SATURATION: 96 % | HEIGHT: 63 IN | BODY MASS INDEX: 29.23 KG/M2 | WEIGHT: 165 LBS | SYSTOLIC BLOOD PRESSURE: 118 MMHG | HEART RATE: 73 BPM | DIASTOLIC BLOOD PRESSURE: 72 MMHG | RESPIRATION RATE: 18 BRPM

## 2025-01-09 DIAGNOSIS — Z13.820 ENCOUNTER FOR OSTEOPOROSIS SCREENING IN ASYMPTOMATIC POSTMENOPAUSAL PATIENT: ICD-10-CM

## 2025-01-09 DIAGNOSIS — Z28.21 PNEUMOCOCCAL VACCINATION DECLINED BY PATIENT: ICD-10-CM

## 2025-01-09 DIAGNOSIS — M99.01 CERVICAL SOMATIC DYSFUNCTION: ICD-10-CM

## 2025-01-09 DIAGNOSIS — Z78.0 ENCOUNTER FOR OSTEOPOROSIS SCREENING IN ASYMPTOMATIC POSTMENOPAUSAL PATIENT: ICD-10-CM

## 2025-01-09 DIAGNOSIS — Z01.00 EYE EXAM, ROUTINE: Primary | ICD-10-CM

## 2025-01-09 DIAGNOSIS — Z00.00 MEDICARE ANNUAL WELLNESS VISIT, SUBSEQUENT: Primary | ICD-10-CM

## 2025-01-09 DIAGNOSIS — Z00.00 MEDICARE ANNUAL WELLNESS VISIT, SUBSEQUENT: ICD-10-CM

## 2025-01-09 DIAGNOSIS — Z98.49 HISTORY OF CATARACT EXTRACTION, UNSPECIFIED LATERALITY: ICD-10-CM

## 2025-01-09 LAB
ALBUMIN SERPL-MCNC: 4.6 G/DL (ref 3.2–4.8)
ALBUMIN/GLOB SERPL: 1.4 {RATIO} (ref 1–2)
ALP LIVER SERPL-CCNC: 70 U/L
ALT SERPL-CCNC: 11 U/L
ANION GAP SERPL CALC-SCNC: 7 MMOL/L (ref 0–18)
AST SERPL-CCNC: 19 U/L (ref ?–34)
BASOPHILS # BLD AUTO: 0.02 X10(3) UL (ref 0–0.2)
BASOPHILS NFR BLD AUTO: 0.4 %
BILIRUB SERPL-MCNC: 0.5 MG/DL (ref 0.2–1.1)
BILIRUB UR QL: NEGATIVE
BUN BLD-MCNC: 11 MG/DL (ref 9–23)
BUN/CREAT SERPL: 11.5 (ref 10–20)
CALCIUM BLD-MCNC: 10 MG/DL (ref 8.7–10.4)
CHLORIDE SERPL-SCNC: 103 MMOL/L (ref 98–112)
CHOLEST SERPL-MCNC: 290 MG/DL (ref ?–200)
CLARITY UR: CLEAR
CO2 SERPL-SCNC: 31 MMOL/L (ref 21–32)
CREAT BLD-MCNC: 0.96 MG/DL
DEPRECATED RDW RBC AUTO: 49.3 FL (ref 35.1–46.3)
EGFRCR SERPLBLD CKD-EPI 2021: 61 ML/MIN/1.73M2 (ref 60–?)
EOSINOPHIL # BLD AUTO: 0.08 X10(3) UL (ref 0–0.7)
EOSINOPHIL NFR BLD AUTO: 1.5 %
ERYTHROCYTE [DISTWIDTH] IN BLOOD BY AUTOMATED COUNT: 14.8 % (ref 11–15)
FASTING PATIENT LIPID ANSWER: YES
FASTING STATUS PATIENT QL REPORTED: YES
GLOBULIN PLAS-MCNC: 3.3 G/DL (ref 2–3.5)
GLUCOSE BLD-MCNC: 95 MG/DL (ref 70–99)
GLUCOSE UR-MCNC: NORMAL MG/DL
HCT VFR BLD AUTO: 42.7 %
HDLC SERPL-MCNC: 60 MG/DL (ref 40–59)
HGB BLD-MCNC: 14 G/DL
HYALINE CASTS #/AREA URNS AUTO: PRESENT /LPF
IMM GRANULOCYTES # BLD AUTO: 0.01 X10(3) UL (ref 0–1)
IMM GRANULOCYTES NFR BLD: 0.2 %
KETONES UR-MCNC: NEGATIVE MG/DL
LDLC SERPL CALC-MCNC: 208 MG/DL (ref ?–100)
LEUKOCYTE ESTERASE UR QL STRIP.AUTO: 250
LYMPHOCYTES # BLD AUTO: 2.09 X10(3) UL (ref 1–4)
LYMPHOCYTES NFR BLD AUTO: 39.3 %
MCH RBC QN AUTO: 29.4 PG (ref 26–34)
MCHC RBC AUTO-ENTMCNC: 32.8 G/DL (ref 31–37)
MCV RBC AUTO: 89.5 FL
MONOCYTES # BLD AUTO: 0.46 X10(3) UL (ref 0.1–1)
MONOCYTES NFR BLD AUTO: 8.6 %
NEUTROPHILS # BLD AUTO: 2.66 X10 (3) UL (ref 1.5–7.7)
NEUTROPHILS # BLD AUTO: 2.66 X10(3) UL (ref 1.5–7.7)
NEUTROPHILS NFR BLD AUTO: 50 %
NITRITE UR QL STRIP.AUTO: NEGATIVE
NONHDLC SERPL-MCNC: 230 MG/DL (ref ?–130)
OSMOLALITY SERPL CALC.SUM OF ELEC: 291 MOSM/KG (ref 275–295)
PH UR: 6 [PH] (ref 5–8)
PLATELET # BLD AUTO: 299 10(3)UL (ref 150–450)
POTASSIUM SERPL-SCNC: 3.7 MMOL/L (ref 3.5–5.1)
PROT SERPL-MCNC: 7.9 G/DL (ref 5.7–8.2)
PROT UR-MCNC: NEGATIVE MG/DL
RBC # BLD AUTO: 4.77 X10(6)UL
SODIUM SERPL-SCNC: 141 MMOL/L (ref 136–145)
SP GR UR STRIP: 1.01 (ref 1–1.03)
TRIGL SERPL-MCNC: 122 MG/DL (ref 30–149)
TSI SER-ACNC: 2.6 UIU/ML (ref 0.55–4.78)
UROBILINOGEN UR STRIP-ACNC: NORMAL
VLDLC SERPL CALC-MCNC: 26 MG/DL (ref 0–30)
WBC # BLD AUTO: 5.3 X10(3) UL (ref 4–11)

## 2025-01-09 PROCEDURE — 80053 COMPREHEN METABOLIC PANEL: CPT

## 2025-01-09 PROCEDURE — 85025 COMPLETE CBC W/AUTO DIFF WBC: CPT

## 2025-01-09 PROCEDURE — 80061 LIPID PANEL: CPT

## 2025-01-09 PROCEDURE — 81001 URINALYSIS AUTO W/SCOPE: CPT

## 2025-01-09 PROCEDURE — 36415 COLL VENOUS BLD VENIPUNCTURE: CPT

## 2025-01-09 PROCEDURE — 84443 ASSAY THYROID STIM HORMONE: CPT

## 2025-01-09 NOTE — PROGRESS NOTES
Subjective:     Patient ID: Ryanne Javed is a 79 year old female.    79 year old AA hypertensive AA female here for medicare annual which will satisfy all the requirements for a complete preventive care physical and for status update on any confirmed chronic medical illnesses and follow up on any previous labs or procedures that were suggestive or in need of further work up. Bowel and bladder function are intact.    Patient needs to follow-up with ophthalmology as she has a history of cataract removal.    Patient denies headaches, chest pain, dizziness, shortness of breath, acute visual changes, N/or exertional fatigue.    Patient is up-to-date on DEXA scan and also mammogram.    Patient declines on shingles vaccine.  Patient declines pneumonia vaccine.    Patient complains of 2-week duration of intermittent left posterior/parietal region discomfort and it can be affected by certain positions.  Patient does get relief with ibuprofen.  Needs an evaluation.  There is a history for migraines, but she states that this is not as significant as her migraines used to be.            History/Other:   Review of Systems  Current Outpatient Medications   Medication Sig Dispense Refill    amLODIPine 10 MG Oral Tab Take 1 tablet (10 mg total) by mouth daily. 90 tablet 3    Benazepril HCl 40 MG Oral Tab Take 1 tablet (40 mg total) by mouth daily. 90 tablet 3    hydroCHLOROthiazide 12.5 MG Oral Cap Take 1 capsule (12.5 mg total) by mouth daily. 90 capsule 3    sodium chloride, hypertonic, 5 % Ophthalmic Ointment Place 1 Application into both eyes as needed. 3.5 g 0    dorzolamide-timolol 2-0.5 % Ophthalmic Solution 1 drop 2 (two) times daily.      polypropylene glycol- 0.4-0.3 % Ophthalmic Solution Place 1 drop into the right eye as needed.      triamcinolone 0.5 % External Ointment Apply 1 Application topically 2 (two) times daily. 30 g 1    latanoprost 0.005 % Ophthalmic Solution Place 1 drop into the left eye nightly. AT  BEDTIME (Patient not taking: Reported on 1/9/2025)      brimonidine 0.15 % Ophthalmic Solution APPLY 1 DROP INTO AFFECTED EYE AS DIRECTED (Patient not taking: Reported on 1/9/2025)      ofloxacin 0.3 % Ophthalmic Solution  (Patient not taking: Reported on 1/9/2025)       Allergies:Allergies[1]    Past Medical History:    Cataract    Essential hypertension      No past surgical history on file.   No family history on file.   Social History:   Social History     Socioeconomic History    Marital status:    Tobacco Use    Smoking status: Never    Smokeless tobacco: Never   Vaping Use    Vaping status: Never Used   Substance and Sexual Activity    Alcohol use: Never    Drug use: Never        Ryanne Javed's SCREENING SCHEDULE   Tests on this list are recommended by your physician but may not be covered, or covered at this frequency, by your insurer. Please check with your insurance carrier before scheduling to verify coverage.   PREVENTATIVE SERVICES  INDICATIONS AND SCHEDULE Internal Lab or Procedure External Lab or Procedure   Diabetes Screening      HbgA1C   Annually No results found for: \"A1C\"      No data to display                Fasting Blood Sugar (FSB)Annually GLUCOSE (mg/dL)   Date Value   01/04/2024 89         No data to display               Cardiovascular Disease Screening     LDL Annually LDL-CHOLESTEROL (mg/dL (calc))   Date Value   01/04/2024 198 (H)        EKG One Time      Colorectal Cancer Screening      Colonoscopy Screen every 10 years No recommendations at this time Update Health Maintenance if applicable    Flex Sigmoidoscopy Screen every 5 years No results found for this or any previous visit.      No data to display                 Fecal Occult Blood Annually No results found for: \"FOB\", \"OCCULTSTOOL\"      No data to display                Glaucoma Screening      Ophthalmology Visit Annually      Bone Density Screening      Dexascan Every two years No results found for this or any previous  visit.        No data to display               Pap and Pelvic      Pap: Every 3 yrs age 21-65 or Pap+HPV every 5 yrs age 30-65, age 65 and older at high risk   No recommendations at this time Update Health Maintenance if applicable    Chlamydia  Annually if high risk No results found for: \"CHLAMYDIA\"      No data to display                Screening Mammogram      Mammogram  Annually No recommendations at this time Update Health Maintenance if applicable   Immunizations      Influenza No orders found for this or any previous visit. Update Immunization Activity if applicable    Pneumococcal No orders found for this or any previous visit. Update Immunization Activity if applicable    Hepatitis B No orders found for this or any previous visit. Update Immunization Activity if applicable    Tetanus No orders found for this or any previous visit. Update Immunization Activity if applicable    Zoster (Not covered by Medicare Part B) No orders found for this or any previous visit. Update Immunization Activity if applicable     SPECIFIC DISEASE MONITORING Internal Lab or Procedure External Lab or Procedure   Annual Monitoring of Persistent     Medications (ACE/ARB, digoxin, diuretics)    Potassium  Annually POTASSIUM (mmol/L)   Date Value   01/04/2024 4.4         No data to display                Creatinine  Annually CREATININE (mg/dL)   Date Value   01/04/2024 0.89         No data to display                Digoxin Serum Conc  Annually No results found for: \"DIGOXIN\"      No data to display                Diabetes      HgbA1C  Annually No results found for: \"A1C\"      No data to display                Creat/alb ratio  Annually      LDL  Annually LDL-CHOLESTEROL (mg/dL (calc))   Date Value   01/04/2024 198 (H)         No data to display                 Dilated Eye exam  Annually      No data to display                   No data to display                COPD      Spirometry Testing Annually No results found for this or any  previous visit.      No data to display                      General Health     In the past six months, have you lost more than 10 pounds without trying?: 2 - No    Has your appetite been poor?: No    Type of Diet: Balanced    How does the patient maintain a good energy level?: Appropriate Exercise;Daily Walks    How would you describe your daily physical activity?: Light    How would you describe your current health state?: Good    How do you maintain positive mental well-being?: Social Interaction;Visiting Family;Puzzles;Games;Visiting Friends         Have you had any immunizations at another office such as Influenza, Hepatitis B, Tetanus, or Pneumococcal?: Yes     Functional Ability     Bathing or Showering: Able without help    Toileting: Able without help    Dressing: Able without help    Eating: Able without help    Driving: Cannot do without help    Preparing your meals: Need some help    Managing money/bills: Need some help    Taking medications as prescribed: Able without help    Are you able to afford your medications?: Yes    Hearing Problems?: Yes     Functional Status     Hearing Problems?: Yes    Vision Problems? : Yes (glasses)    Difficulty walking?: No    Difficulty dressing or bathing?: No    Problems with daily activities? : No           Fall/Risk Assessment                                                              Depression Screening (PHQ-2/PHQ-9): Over the LAST 2 WEEKS                      Advance Directives     Do you have a healthcare power of ?: Yes    Do you have a living will?: Yes     Hearing Assessment (Required for AWV/SWV)      Hearing Screening    Screening Method: Questionnaire  I have a problem hearing over the telephone: No I have trouble following the conversations when two or more people are talking at the same time: No   I have trouble understanding things on the TV: No I have to strain to understand conversations: No   I have to worry about missing the telephone ring  or doorbell: No I have trouble hearing conversations in a noisy background such as a crowded room or restaurant: No   I get confused about where sounds come from: No I misunderstand some words in a sentence and need to ask people to repeat themselves: No   I especially have trouble understanding the speech of women and children: No I have trouble understanding the speaker in a large room such as at a meeting or place of [employer]ab[employer]ist: No   Many people I talk to seem to mumble (or don't speak clearly): Yes People get annoyed because I misunderstand what they say: No   I misunderstand what others are saying and make inappropriate responses: No I avoid social activities because I cannot hear well and fear I will reply improperly: No   Family members and friends have told me they think I may have hearing loss: No             Visual Acuity     Right Eye Visual Acuity: Corrected Left Eye Visual Acuity: Corrected   Right Eye Chart Acuity: 20/100 Left Eye Chart Acuity: 20/80     Cognitive Assessment     What day of the week is this?: Correct    What month is it?: Correct    What year is it?: Correct    Recall \"Ball\": Correct    Recall \"Flag\": Correct    Recall \"Tree\": Correct          Objective:   Vitals:    01/09/25 1105   BP: 118/72   Pulse:    Resp:    Temp:        Physical Exam  HENT:      Head: Normocephalic and atraumatic.      Right Ear: Tympanic membrane normal.      Left Ear: Tympanic membrane normal.      Nose: Nose normal.      Mouth/Throat:      Mouth: Mucous membranes are moist.   Cardiovascular:      Rate and Rhythm: Normal rate and regular rhythm.      Heart sounds:      No gallop.   Pulmonary:      Effort: Pulmonary effort is normal.      Breath sounds: Normal breath sounds.   Abdominal:      General: Bowel sounds are normal.      Palpations: There is no mass.      Comments: No pulsatile mass.  No abdominal bruit.   Musculoskeletal:      Cervical back: Rigidity, spasms, tenderness and crepitus present. Pain with  movement present. Decreased range of motion.        Back:       Comments: Region of discomfort as depicted.  Malrotation as depicted paraspinal spasm as depicted.   Neurological:      Mental Status: She is alert and oriented to person, place, and time.   Psychiatric:         Mood and Affect: Mood normal.         Assessment & Plan:   1. Medicare annual wellness visit, subsequent  Survey completed and the following labs have been ordered.  - CBC W Differential W Platelet [E]; Future  - Comp Metabolic Panel (14) [E]; Future  - Lipid Panel [E]; Future  - TSH [E]; Future  - Urinalysis, Routine [E]; Future    2. Pneumococcal vaccination declined by patient  Declined by patient.  - Prevnar 20 (PCV20) [40025]    3. Encounter for osteoporosis screening in asymptomatic postmenopausal patient  Initially ordered.  Patient is current.  Chart updated.    4. Cervical somatic dysfunction  Osteopathic manipulation performed in the cervical region with immediate release obtained.  - OSTEOPATHIC MANIP,1-2 BODY REGN    5. History of cataract extraction, unspecified laterality  Referred.  - Ophthalmology Referral - In Network      Orders Placed This Encounter   Procedures    CBC W Differential W Platelet [E]    Comp Metabolic Panel (14) [E]    Lipid Panel [E]    TSH [E]    Urinalysis, Routine [E]    Prevnar 20 (PCV20) [01609]       Meds This Visit:  Requested Prescriptions      No prescriptions requested or ordered in this encounter       Imaging & Referrals:  PCV20 VACCINE FOR INTRAMUSCULAR USE  XR DEXA BONE DENSITOMETRY (CPT=77080)     Patient Instructions   All adult screening ordered and done appropriate for patient's age and gender and risk factors and complaints.  Encouraged physical fitness and daily physical activity daily.  Medication reviewed and renewed where needed and appropriate.  Comply with medications.  Monitor blood pressures and record at home. Limit salt intake.  Recommend weight loss via daily exercising and  consistent healthy dietary changes.    Return if symptoms worsen or fail to improve.         [1] No Known Allergies

## 2025-01-09 NOTE — PATIENT INSTRUCTIONS
All adult screening ordered and done appropriate for patient's age and gender and risk factors and complaints.  Encouraged physical fitness and daily physical activity daily.  Medication reviewed and renewed where needed and appropriate.  Comply with medications.  Monitor blood pressures and record at home. Limit salt intake.  Recommend weight loss via daily exercising and consistent healthy dietary changes.

## 2025-01-09 NOTE — TELEPHONE ENCOUNTER
Patient had an appointment today with Dr. Max. She needs a referral for Dr. Dale Burris in [place], history of cataracts. She has an appointment with him on 1/14/25, please fax the referral to his office. Also, she'll need an order for her annual mammogram. Please mail the order to her, she has it done at [facility], no appointment yet.

## 2025-01-10 NOTE — TELEPHONE ENCOUNTER
Referral already signed on the chart.  I signed this referral the second time in order that there is no mixup or hold-up.  Thank you.

## 2025-01-11 ENCOUNTER — TELEPHONE (OUTPATIENT)
Dept: FAMILY MEDICINE CLINIC | Facility: CLINIC | Age: 80
End: 2025-01-11

## 2025-01-11 DIAGNOSIS — H40.9 GLAUCOMA, UNSPECIFIED GLAUCOMA TYPE, UNSPECIFIED LATERALITY: Primary | ICD-10-CM

## 2025-01-11 DIAGNOSIS — H26.9 CATARACT OF BOTH EYES, UNSPECIFIED CATARACT TYPE: ICD-10-CM

## 2025-01-11 DIAGNOSIS — Z01.00 ENCOUNTER FOR COMPLETE EYE EXAM: ICD-10-CM

## 2025-01-11 NOTE — TELEPHONE ENCOUNTER
Patient called to request referral to Dr. Burris to be faxed. Patient is scheduled Tuesday 1/14/2025 at 9am.    Fax # [phone]

## 2025-01-13 NOTE — TELEPHONE ENCOUNTER
Dr. Max,     Spoke with patient, she needs a new referral, for tomorrow.    Patient has appointment with Dr Suleiman Burris on 1/14/25 for an eye exam and glaucoma, and cataracts.     Pended referral please review diagnosis and sign off if you agree.    Thank you.  Tati Linder  [facility]

## 2025-01-21 ENCOUNTER — TELEPHONE (OUTPATIENT)
Dept: FAMILY MEDICINE CLINIC | Facility: CLINIC | Age: 80
End: 2025-01-21

## 2025-01-21 DIAGNOSIS — E78.5 HYPERLIPIDEMIA, UNSPECIFIED HYPERLIPIDEMIA TYPE: Primary | ICD-10-CM

## 2025-01-21 RX ORDER — ATORVASTATIN CALCIUM 40 MG/1
40 TABLET, FILM COATED ORAL NIGHTLY
Qty: 90 TABLET | Refills: 1 | Status: SHIPPED | OUTPATIENT
Start: 2025-01-21

## 2025-01-21 NOTE — TELEPHONE ENCOUNTER
patient called us as she received a message from her pharmacy that she has a medication ready for her.   Patient was inform of you message below and she verbalized understanding.     Dr. Max patient does have urine frequency and slight itchiness/burning but it goes away. Patient denied any pain, foul smell and urine is light yellow. Please advise         Your labs have been reviewed.  Your total and LDL cholesterol remain in the high risk range and conventional prescription medication to lower your cholesterol is recommended.  Prescription sent to the pharmacy.  Your urinalysis is suggestive of a urinary tract infection.  Please call clinic and let us know if you have symptoms consistent with urinary frequency or discomfort when you urinate or discoloration or malodorous urine.  You are not anemic.  Your thyroid screen is normal.  You are not diabetic.  Your liver and kidney functions are normal.  Your proteins and electrolytes are normal.   Written by Van Max DO on 1/21/2025 12:57 AM CST

## 2025-01-25 ENCOUNTER — HOSPITAL ENCOUNTER (OUTPATIENT)
Age: 80
Discharge: HOME | End: 2025-01-25
Payer: MEDICARE

## 2025-01-25 VITALS
HEART RATE: 67 BPM | OXYGEN SATURATION: 99 % | RESPIRATION RATE: 19 BRPM | TEMPERATURE: 98 F | SYSTOLIC BLOOD PRESSURE: 114 MMHG | DIASTOLIC BLOOD PRESSURE: 75 MMHG

## 2025-01-25 DIAGNOSIS — N30.01 ACUTE CYSTITIS WITH HEMATURIA: Primary | ICD-10-CM

## 2025-01-25 LAB
BILIRUB UR QL STRIP: NEGATIVE
CLARITY UR: CLEAR
COLOR UR: YELLOW
GLUCOSE UR STRIP-MCNC: NEGATIVE MG/DL
KETONES UR STRIP-MCNC: NEGATIVE MG/DL
NITRITE UR QL STRIP: NEGATIVE
PH UR STRIP: 7 [PH]
PROT UR STRIP-MCNC: NEGATIVE MG/DL
SP GR UR STRIP: 1.01
UROBILINOGEN UR STRIP-ACNC: <2 MG/DL

## 2025-01-25 PROCEDURE — 81002 URINALYSIS NONAUTO W/O SCOPE: CPT | Performed by: NURSE PRACTITIONER

## 2025-01-25 PROCEDURE — 99214 OFFICE O/P EST MOD 30 MIN: CPT | Performed by: NURSE PRACTITIONER

## 2025-01-25 RX ORDER — FLUCONAZOLE 150 MG/1
150 TABLET ORAL DAILY
Qty: 2 TABLET | Refills: 0 | Status: SHIPPED | OUTPATIENT
Start: 2025-01-25 | End: 2025-01-27

## 2025-01-25 RX ORDER — CEPHALEXIN 500 MG/1
500 CAPSULE ORAL 2 TIMES DAILY
Qty: 14 CAPSULE | Refills: 0 | Status: SHIPPED | OUTPATIENT
Start: 2025-01-25 | End: 2025-02-01

## 2025-01-25 NOTE — ED PROVIDER NOTES
Patient Seen in: [facility]      History     Chief Complaint   Patient presents with    Dysuria     Stated Complaint: Urinary Symptoms    Subjective: This is a 79-year-old female, past medical history of hypertension, presents to immediate care with dysuria x 1 week.  Positive frequency without urgency.  No hematuria or retention.  Some vaginal itching and irritation without discharge.  Worse after tub baths.  Patient without abdominal pain, pelvic pain, flank pain, back pain.  No nausea, vomiting, diarrhea.  No fever, chills, fatigue.  Patient was advised to come to immediate care for urinalysis.  She is well-appearing.  AOx4.  The history is provided by the patient.             Objective:     No pertinent past medical history.            No pertinent past surgical history.              No pertinent social history.            Review of Systems   Constitutional: Negative.    HENT: Negative.     Eyes: Negative.    Respiratory: Negative.     Cardiovascular: Negative.    Gastrointestinal: Negative.    Genitourinary:  Positive for dysuria and frequency. Negative for decreased urine volume, difficulty urinating, flank pain, genital sores, hematuria, menstrual problem, pelvic pain, urgency, vaginal bleeding, vaginal discharge and vaginal pain.   Musculoskeletal: Negative.    Skin: Negative.    Neurological: Negative.        Positive for stated complaint: Urinary Symptoms  Other systems are as noted in HPI.  Constitutional and vital signs reviewed.      All other systems reviewed and negative except as noted above.    Physical Exam     ED Triage Vitals [01/25/25 0944]   /75   Pulse 67   Resp 19   Temp 98.4 °F (36.9 °C)   Temp src Oral   SpO2 99 %   O2 Device None (Room air)       Current Vitals:   Vital Signs  BP: 114/75  Pulse: 67  Resp: 19  Temp: 98.4 °F (36.9 °C)  Temp src: Oral    Oxygen Therapy  SpO2: 99 %  O2 Device: None (Room air)        Physical Exam  Constitutional:       General: She is not  in acute distress.     Appearance: Normal appearance. She is not ill-appearing or toxic-appearing.   HENT:      Head: Normocephalic.      Right Ear: External ear normal.      Left Ear: External ear normal.   Cardiovascular:      Rate and Rhythm: Normal rate and regular rhythm.      Pulses: Normal pulses.   Pulmonary:      Effort: Pulmonary effort is normal.      Breath sounds: Normal breath sounds.   Abdominal:      General: Abdomen is flat. Bowel sounds are normal. There is no distension.      Palpations: Abdomen is soft.      Tenderness: There is no abdominal tenderness. There is no right CVA tenderness, left CVA tenderness, guarding or rebound.   Musculoskeletal:      Cervical back: Normal range of motion.   Lymphadenopathy:      Cervical: No cervical adenopathy.   Skin:     General: Skin is warm.      Capillary Refill: Capillary refill takes less than 2 seconds.   Neurological:      General: No focal deficit present.      Mental Status: She is alert and oriented to person, place, and time.             ED Course     Labs Reviewed   [facility] POCT URINALYSIS DIPSTICK - Abnormal; Notable for the following components:       Result Value    Blood, Urine Trace-Intact (*)     Leukocyte esterase urine Trace (*)     All other components within normal limits   URINE CULTURE, ROUTINE                   MDM      Differentials considered include: Acute cystitis with hematuria, acute cystitis without hematuria, overactive bladder, vaginitis versus yeast infection.    Patient with dysuria and frequency.  She also states vaginal itching and irritation, mostly noticed after tub baths.  No discharge.  No vaginal bleeding.    Patient without abdominal pain, flank pain, back pain.  No hematuria or retention.  No CVA tenderness percussion.  Low suspicion for nephrolithiasis and pyelonephritis.    Urine obtained, positive leukocytes and blood.  Will send for culture.  Will start patient on antibiotics of Keflex.  She is aware to start  antibiotics today.  She is aware to take with food and water.  She is aware of length and duration of antibiotics.    Patient is displaying some symptoms of vaginitis without discharge.  No history of yeast infections.  However, we will send fluconazole to pharmacy.  Patient is aware she can start dose today, wait 72 hours and if still symptomatic, take second dose.    She is aware of signs symptoms that warrant immediate ER evaluation.  She feels comfortable this plan of care.  All questions answered at time of discharge.        Medical Decision Making      Disposition and Plan     Clinical Impression:  1. Acute cystitis with hematuria         Disposition:  Discharge  1/25/2025 10:02 am    Follow-up:  Van Max, DO  [address] [place] [address]  [phone]      If symptoms worsen          Medications Prescribed:  Discharge Medication List as of 1/25/2025 10:02 AM        START taking these medications    Details   cephALEXin 500 MG Oral Cap Take 1 capsule (500 mg total) by mouth 2 (two) times daily for 7 days., Normal, Disp-14 capsule, R-0      fluconazole 150 MG Oral Tab Take 1 tablet (150 mg total) by mouth daily for 2 doses., Normal, Disp-2 tablet, R-0Take first dose today,wait 72 hours, if still symptomatic, take 2nd dose                 Supplementary Documentation:

## 2025-01-25 NOTE — DISCHARGE INSTRUCTIONS
As discussed, does appear that you have a bladder infection/UTI on urine provided.  These terms are used interchangeably.  I will send your urine for culture.  Will have results about 48 hours.  Some will contact you regarding results and if antibiotics need to be changed.    I have sent antibiotics to the pharmacy.  You may take twice a day for 7 days.  Take with food and water.    Also prescribed fluconazole.  This is used for vaginal itching with suspected yeast infection.  Take first dose today.  Wait 72 hours and if still symptomatic, take second dose.    Drink plenty of water.  Stay well-hydrated.  If you have any worsening urinary symptoms, abdominal pain, flank pain, back pain, fever, chills, nausea, vomiting, blood in urine, inability urinate, please go to emergency room.

## 2025-01-25 NOTE — ED INITIAL ASSESSMENT (HPI)
Patient with dysuria x 1 week   Her PCP advised her to come for urine dip  No fever  No flank pain

## 2025-01-27 NOTE — TELEPHONE ENCOUNTER
Marely Gray, APRN   Nurse Practitioner  Specialty: Nurse Practitioner Family     ED Provider Notes     Signed     Date of Service: 1/25/2025 10:09 AM     Signed       Expand All Collapse All       Patient Seen in: [facility]

## 2025-07-07 DIAGNOSIS — I10 PRIMARY HYPERTENSION: ICD-10-CM

## 2025-07-09 RX ORDER — BENAZEPRIL HYDROCHLORIDE 40 MG/1
40 TABLET ORAL DAILY
Qty: 90 TABLET | Refills: 3 | Status: SHIPPED | OUTPATIENT
Start: 2025-07-09

## 2025-07-09 RX ORDER — HYDROCHLOROTHIAZIDE 12.5 MG/1
12.5 CAPSULE ORAL DAILY
Qty: 90 CAPSULE | Refills: 3 | Status: SHIPPED | OUTPATIENT
Start: 2025-07-09

## 2025-07-10 NOTE — TELEPHONE ENCOUNTER
Refill Passed Per Protocol    Requested Prescriptions   Pending Prescriptions Disp Refills    HYDROCHLOROTHIAZIDE 12.5 MG Oral Cap [Pharmacy Med Name: hydroCHLOROthiazide Oral Capsule 12.5 MG] 90 capsule 3     Sig: TAKE 1 CAPSULE EVERY DAY       Hypertension Medications Protocol Passed - 7/9/2025 11:20 PM        Passed - CMP or BMP in past 12 months        Passed - Last BP reading less than 140/90     BP Readings from Last 1 Encounters:   01/25/25 114/75               Passed - In person appointment or virtual visit in the past 12 mos or appointment in next 3 mos     Recent Outpatient Visits              6 months ago Medicare annual wellness visit, subsequent    [facility], [facility], [facility]    Office Visit    1 year ago Medicare annual wellness visit, subsequent    [facility], [facility]    Office Visit    2 years ago Encounter for screening mammogram for breast cancer    [facility], [facility]    Office Visit    3 years ago Colon cancer screening    [facility], [facility]    Office Visit                      Passed - EGFRCR or GFRAA > 50     GFR Evaluation  EGFRCR: 61 , resulted on 1/9/2025          Passed - Medication is active on med list          BENAZEPRIL HCL 40 MG Oral Tab [Pharmacy Med Name: Benazepril HCl Oral Tablet 40 MG] 90 tablet 3     Sig: TAKE 1 TABLET EVERY DAY       Hypertension Medications Protocol Passed - 7/9/2025 11:20 PM        Passed - CMP or BMP in past 12 months        Passed - Last BP reading less than 140/90     BP Readings from Last 1 Encounters:   01/25/25 114/75               Passed - In person appointment or virtual visit in the past 12 mos or appointment in next 3 mos     Recent Outpatient Visits              6 months ago Medicare annual wellness visit, subsequent    [facility]    [facility]    1 year ago Medicare annual wellness visit, subsequent    [facility], [facility]    Office Visit    2 years ago Encounter for screening mammogram for breast cancer    [facility]    [facility]    3 years ago Colon cancer screening    [facility]    [facility]                      Passed - EGFRCR or GFRAA > 50     GFR Evaluation  EGFRCR: 61 , resulted on 1/9/2025          Passed - Medication is active on med list               Recent Outpatient Visits              6 months ago Medicare annual wellness visit, subsequent    [facility]    [facility] Visit    1 year ago Medicare annual wellness visit, subsequent    [facility]    [facility]    2 years ago Encounter for screening mammogram for breast cancer    [facility]    [facility]    3 years ago Colon cancer screening    [facility]    [facility] Visit

## 2025-07-18 DIAGNOSIS — E78.5 HYPERLIPIDEMIA, UNSPECIFIED HYPERLIPIDEMIA TYPE: ICD-10-CM

## 2025-07-22 RX ORDER — ATORVASTATIN CALCIUM 40 MG/1
40 TABLET, FILM COATED ORAL NIGHTLY
Qty: 90 TABLET | Refills: 3 | Status: SHIPPED | OUTPATIENT
Start: 2025-07-22

## 2025-07-22 NOTE — TELEPHONE ENCOUNTER
Refill Per Protocol     Requested Prescriptions   Pending Prescriptions Disp Refills    ATORVASTATIN 40 MG Oral Tab [Pharmacy Med Name: ATORVASTATIN 40 MG TABLET] 90 tablet 1     Sig: TAKE 1 TABLET BY MOUTH EVERY DAY AT NIGHT       Cholesterol Medication Protocol Passed - 7/22/2025 10:48 AM        Passed - ALT < 80     Lab Results   Component Value Date    ALT 11 01/09/2025             Passed - ALT resulted within past year        Passed - Lipid panel within past 12 months     Lab Results   Component Value Date    CHOLEST 290 (H) 01/09/2025    TRIG 122 01/09/2025    HDL 60 (H) 01/09/2025     (H) 01/09/2025    VLDL 26 01/09/2025    TCHDLRATIO 4.3 01/04/2024    NONHDLC 230 (H) 01/09/2025             Passed - In person appointment or virtual visit in the past 12 mos or appointment in next 3 mos     Recent Outpatient Visits              6 months ago Medicare annual wellness visit, subsequent    [facility]    [facility]    1 year ago Medicare annual wellness visit, subsequent    [facility]    [facility]    2 years ago Encounter for screening mammogram for breast cancer    [facility]    [facility]    3 years ago Colon cancer screening    [facility] Forrest J, DO    Office Visit                      Passed - Medication is active on med list